# Patient Record
Sex: FEMALE | Race: WHITE | NOT HISPANIC OR LATINO | URBAN - METROPOLITAN AREA
[De-identification: names, ages, dates, MRNs, and addresses within clinical notes are randomized per-mention and may not be internally consistent; named-entity substitution may affect disease eponyms.]

---

## 2024-02-16 ENCOUNTER — OFFICE VISIT (OUTPATIENT)
Dept: NEUROSURGERY | Facility: CLINIC | Age: 74
End: 2024-02-16
Payer: MEDICARE

## 2024-02-16 VITALS — HEIGHT: 63 IN | RESPIRATION RATE: 18 BRPM | WEIGHT: 212 LBS | BODY MASS INDEX: 37.56 KG/M2

## 2024-02-16 DIAGNOSIS — E11.40 DIABETIC NEUROPATHY, PAINFUL (MULTI): ICD-10-CM

## 2024-02-16 DIAGNOSIS — M96.1 FAILED BACK SURGICAL SYNDROME: Primary | ICD-10-CM

## 2024-02-16 DIAGNOSIS — Z96.89 STATUS POST INSERTION OF SPINAL CORD STIMULATOR: ICD-10-CM

## 2024-02-16 PROCEDURE — 95971 ALYS SMPL SP/PN NPGT W/PRGRM: CPT | Performed by: NEUROLOGICAL SURGERY

## 2024-02-16 PROCEDURE — 4010F ACE/ARB THERAPY RXD/TAKEN: CPT | Performed by: NEUROLOGICAL SURGERY

## 2024-02-16 PROCEDURE — 1159F MED LIST DOCD IN RCRD: CPT | Performed by: NEUROLOGICAL SURGERY

## 2024-02-16 PROCEDURE — 1125F AMNT PAIN NOTED PAIN PRSNT: CPT | Performed by: NEUROLOGICAL SURGERY

## 2024-02-16 PROCEDURE — 1036F TOBACCO NON-USER: CPT | Performed by: NEUROLOGICAL SURGERY

## 2024-02-16 PROCEDURE — 99215 OFFICE O/P EST HI 40 MIN: CPT | Performed by: NEUROLOGICAL SURGERY

## 2024-02-16 RX ORDER — BISOPROLOL FUMARATE AND HYDROCHLOROTHIAZIDE 10; 6.25 MG/1; MG/1
1 TABLET ORAL DAILY
COMMUNITY

## 2024-02-16 RX ORDER — CHOLECALCIFEROL (VITAMIN D3) 25 MCG
2000 TABLET ORAL
COMMUNITY
Start: 2016-04-15

## 2024-02-16 RX ORDER — SEMAGLUTIDE 2.68 MG/ML
INJECTION, SOLUTION SUBCUTANEOUS
COMMUNITY
Start: 2024-02-04

## 2024-02-16 RX ORDER — DULOXETIN HYDROCHLORIDE 60 MG/1
60 CAPSULE, DELAYED RELEASE ORAL DAILY
COMMUNITY

## 2024-02-16 RX ORDER — CHOLECALCIFEROL (VITAMIN D3) 25 MCG
5000 TABLET,CHEWABLE ORAL DAILY
COMMUNITY
Start: 2016-04-15

## 2024-02-16 RX ORDER — PREGABALIN 100 MG/1
100 CAPSULE ORAL 2 TIMES DAILY
COMMUNITY
Start: 2017-05-20

## 2024-02-16 RX ORDER — INSULIN GLARGINE 100 [IU]/ML
45 INJECTION, SOLUTION SUBCUTANEOUS NIGHTLY
COMMUNITY

## 2024-02-16 RX ORDER — METFORMIN HYDROCHLORIDE 1000 MG/1
1000 TABLET ORAL
COMMUNITY

## 2024-02-16 RX ORDER — PREGABALIN 150 MG/1
150 CAPSULE ORAL NIGHTLY
COMMUNITY
Start: 2024-01-04

## 2024-02-16 RX ORDER — LOSARTAN POTASSIUM 50 MG/1
50 TABLET ORAL DAILY
COMMUNITY

## 2024-02-16 RX ORDER — ATORVASTATIN CALCIUM 40 MG/1
40 TABLET, FILM COATED ORAL DAILY
COMMUNITY

## 2024-02-16 RX ORDER — OMEPRAZOLE 40 MG/1
20 CAPSULE, DELAYED RELEASE ORAL
COMMUNITY

## 2024-02-16 RX ORDER — ICOSAPENT ETHYL 1 G/1
2 CAPSULE ORAL
COMMUNITY

## 2024-02-16 RX ORDER — AMLODIPINE BESYLATE 5 MG/1
5 TABLET ORAL DAILY
COMMUNITY

## 2024-02-16 ASSESSMENT — PAIN SCALES - GENERAL: PAINLEVEL: 2

## 2024-02-16 NOTE — PROGRESS NOTES
NEUROSURGERY EVALUATION NOTE    Diagnosis  Rachelle was seen today for stimulator.  Diagnoses and all orders for this visit:  Failed back surgical syndrome  Diabetic neuropathy, painful (CMS/HCC)  Status post insertion of spinal cord stimulator  Other orders  -     Cancel: Point of Care Neuromuscular US; Future      Patient Discussion/Summary  Today you were provided with additional programming to better cover all of your areas of pain, particularly into your feet, including 3 LD programs and 2 HD programs.  We also discussed that back and leg pain is multifactorial, and that working with physical therapy to strengthen the core, low back, and leg muscles can help to restore function and reduce pain. We also discussed the use of holistic medicine, such as acupuncture, chiropractic care, and message, for alternative therapies for your pain. Additionally, we discussed the possibility of reestablishing with pain medicine for assistance with medical management of your pain.  Follow up, as needed, for further programming.       Provider Impressions  74 y.o. female with FBSS, with back pain, as well as severe diabetic neuropathic pain in feet, s/p perc thoracic SCS on 4/7/22 w/ [facility]. Today patient continues to do well regarding pain relief with stim in back, but still with severe diabetic neuropathic pain in feet. As such, patient was provided with additional programming to better cover all of her areas of pain, particularly in her feet, including 3 new LD programs and 2 new HD, which she was happy with. If patient continues to have suboptimal relief of her neuropathic foot pain, we can consider replacement of her device with another company with increased functionality and coverage.       History of Present Illness  Chief Complaint:   Chief Complaint   Patient presents with    stimulator         HPI: Rachelle Jones is a 74 y.o. female with FBSS, with back pain, as well as severe diabetic neuropathic pain in feet, s/p  perc thoracic SCS on 4/7/22 renita/ [facility]. During last OV with me on 7/21/23 patient had reported a fall around Aug-Sept 2022, system was interrogated and had normal impedances, and updated lumbar and thoracic Xrays were obtained showing stable lead placement. Pt reported good pain relief with stim of ~70% in back and ~50% in feet, however, was still having severe pain in feet. Patient also reported mild increase in her back pain over the last ~6 months. As such, patient was provided with additional programming, including adjustment to her favorite HF program, as well as additional low dose stim programs, to better cover all of her areas of pain. Patient presents today for follow up and programming as needed.    Patient presents today with her . Patient reports to be doing overall well regarding pain relief with stim in her back, but continues to have severe neuropathic pain in her feet, particularly in her toes. Patient also reports that her pain is more severe in the right, but is progressively worsening in the left, and is having difficulty sleeping due to the severity of this. Patient takes higher dose of Lyrica, as well as Cymbalta.      ROS: As noted in HPI.      Previous History  Past Medical History:   Diagnosis Date    Gastro-esophageal reflux disease without esophagitis     Gastroesophageal reflux disease, esophagitis presence not specified    Other allergy status, other than to drugs and biological substances     Environmental allergies    Personal history of other diseases of the circulatory system     History of hypertension    Personal history of other endocrine, nutritional and metabolic disease     History of diabetes mellitus    Pure hypercholesterolemia, unspecified     High cholesterol     Past Surgical History:   Procedure Laterality Date    CHOLECYSTECTOMY  04/15/2016    Cholecystectomy    HYSTERECTOMY  04/15/2016    Hysterectomy    TONSILLECTOMY  04/15/2016    Tonsillectomy With Adenoidectomy  "   TOTAL KNEE ARTHROPLASTY  04/15/2016    Knee Replacement     Social History     Tobacco Use    Smoking status: Never    Smokeless tobacco: Never     No family history on file.  Allergies   Allergen Reactions    Codeine Hives     Current Outpatient Medications   Medication Instructions    amLODIPine (NORVASC) 5 mg, oral, Daily    atorvastatin (LIPITOR) 40 mg, oral, Daily    bisoproloL-hydrochlorothiazide (Ziac) 10-6.25 mg tablet 1 tablet, oral, Daily    cholecalciferol (VITAMIN D-3) 2,000 Units, oral    cyanocobalamin (VITAMIN B-12) 5,000 mcg, oral, Daily    DULoxetine (CYMBALTA) 60 mg, oral, Daily    icosapent ethyL (VASCEPA) 2 g, oral, 2 times daily with meals    insulin glargine (LANTUS) 45 Units, subcutaneous, Nightly    losartan (COZAAR) 50 mg, oral, Daily    metFORMIN (GLUCOPHAGE) 1,000 mg, oral, 2 times daily with meals    omeprazole (PRILOSEC) 20 mg, oral, Daily before breakfast    Ozempic 2 mg/dose (8 mg/3 mL) pen injector INJECT 2 MG SUBCUTANEOUSLY ONCE A WEEK FOR 30 DAYS    pregabalin (LYRICA) 100 mg, oral, 2 times daily    pregabalin (LYRICA) 150 mg, oral, Nightly         Vitals  Resp 18   Ht 1.6 m (5' 3\")   Wt 96.2 kg (212 lb)   BMI 37.55 kg/m²       Physical Exam  Well appearing, in no acute distress. Cranial nerves 2-12 intact (wears L hearing aid). Full strength throughout. Antalgic gait, ambulates with cane.                 "

## 2024-02-22 PROBLEM — H61.23 BILATERAL IMPACTED CERUMEN: Status: ACTIVE | Noted: 2024-02-22

## 2024-02-22 PROBLEM — E66.01 OBESITY, MORBID, BMI 40.0-49.9 (MULTI): Status: ACTIVE | Noted: 2017-05-24

## 2024-02-22 PROBLEM — I10 ESSENTIAL HYPERTENSION, BENIGN: Status: ACTIVE | Noted: 2017-02-10

## 2024-02-22 PROBLEM — K11.7 XEROSTOMIA: Status: ACTIVE | Noted: 2024-02-22

## 2024-02-22 PROBLEM — H91.93 BILATERAL HEARING LOSS: Status: ACTIVE | Noted: 2017-02-10

## 2024-02-22 PROBLEM — H90.3 SENSORINEURAL HEARING LOSS, BILATERAL: Status: ACTIVE | Noted: 2024-02-22

## 2024-02-22 PROBLEM — E11.40 DIABETIC NEUROPATHY, PAINFUL (MULTI): Status: ACTIVE | Noted: 2024-02-22

## 2024-02-22 PROBLEM — H93.13 TINNITUS, BILATERAL: Status: ACTIVE | Noted: 2024-02-22

## 2024-02-22 PROBLEM — M96.1 POSTLAMINECTOMY SYNDROME OF LUMBAR REGION: Status: ACTIVE | Noted: 2024-02-22

## 2024-02-22 PROBLEM — L29.9 ITCHING OF EAR: Status: ACTIVE | Noted: 2024-02-22

## 2024-02-22 RX ORDER — INSULIN GLARGINE-YFGN 100 [IU]/ML
INJECTION, SOLUTION SUBCUTANEOUS
COMMUNITY
Start: 2024-01-09

## 2024-02-22 RX ORDER — FIBER
1 TABLET ORAL DAILY
COMMUNITY

## 2024-02-22 RX ORDER — CETIRIZINE HYDROCHLORIDE 10 MG/1
1 TABLET ORAL DAILY
COMMUNITY

## 2024-02-22 RX ORDER — ACETAMINOPHEN 500 MG/1
CAPSULE, LIQUID FILLED ORAL EVERY 6 HOURS
COMMUNITY

## 2024-02-22 RX ORDER — INSULIN LISPRO 100 [IU]/ML
INJECTION, SOLUTION INTRAVENOUS; SUBCUTANEOUS
COMMUNITY
Start: 2017-11-13

## 2024-02-22 RX ORDER — TRIAMCINOLONE ACETONIDE 1 MG/G
PASTE DENTAL
COMMUNITY
Start: 2023-05-18

## 2024-02-22 RX ORDER — INSULIN LISPRO 100 [IU]/ML
INJECTION, SOLUTION INTRAVENOUS; SUBCUTANEOUS
COMMUNITY
Start: 2021-11-15

## 2024-02-22 RX ORDER — BLOOD SUGAR DIAGNOSTIC
STRIP MISCELLANEOUS
COMMUNITY

## 2024-02-22 RX ORDER — CEPHALEXIN 500 MG/1
CAPSULE ORAL
COMMUNITY
Start: 2023-06-22

## 2024-02-22 RX ORDER — METHYLPREDNISOLONE 4 MG/1
TABLET ORAL
COMMUNITY
Start: 2023-09-26

## 2024-02-22 RX ORDER — BUPRENORPHINE 10 UG/H
PATCH TRANSDERMAL
COMMUNITY
Start: 2021-09-28

## 2024-02-22 RX ORDER — IBUPROFEN 800 MG/1
800 TABLET ORAL EVERY 8 HOURS PRN
COMMUNITY
Start: 2023-09-26

## 2024-02-22 RX ORDER — BLOOD-GLUCOSE METER
EACH MISCELLANEOUS
COMMUNITY
Start: 2023-09-26

## 2024-02-22 RX ORDER — FAMOTIDINE 20 MG/1
TABLET, FILM COATED ORAL 2 TIMES DAILY
COMMUNITY

## 2024-02-22 RX ORDER — NAPROXEN SODIUM 220 MG/1
81 TABLET, FILM COATED ORAL
COMMUNITY

## 2024-02-22 RX ORDER — AMOXICILLIN 500 MG/1
CAPSULE ORAL
COMMUNITY
Start: 2023-09-26

## 2024-02-22 RX ORDER — CELECOXIB 200 MG/1
1 CAPSULE ORAL 2 TIMES DAILY
COMMUNITY
Start: 2014-08-19

## 2024-02-22 RX ORDER — POLYETHYLENE GLYCOL-3350 AND ELECTROLYTES 236; 6.74; 5.86; 2.97; 22.74 G/274.31G; G/274.31G; G/274.31G; G/274.31G; G/274.31G
POWDER, FOR SOLUTION ORAL
COMMUNITY
Start: 2023-07-06

## 2024-02-22 RX ORDER — DOCUSATE SODIUM 100 MG/1
100 CAPSULE, LIQUID FILLED ORAL
COMMUNITY

## 2024-02-22 RX ORDER — BUPRENORPHINE 5 UG/H
PATCH TRANSDERMAL
COMMUNITY
Start: 2021-09-02

## 2024-02-23 ENCOUNTER — APPOINTMENT (OUTPATIENT)
Dept: NEUROSURGERY | Facility: CLINIC | Age: 74
End: 2024-02-23
Payer: MEDICARE

## 2024-03-15 ENCOUNTER — APPOINTMENT (OUTPATIENT)
Dept: NEUROSURGERY | Facility: CLINIC | Age: 74
End: 2024-03-15
Payer: MEDICARE